# Patient Record
Sex: MALE | Race: WHITE
[De-identification: names, ages, dates, MRNs, and addresses within clinical notes are randomized per-mention and may not be internally consistent; named-entity substitution may affect disease eponyms.]

---

## 2017-04-25 ENCOUNTER — HOSPITAL ENCOUNTER (OUTPATIENT)
Dept: HOSPITAL 58 - SURG | Age: 57
Discharge: HOME | End: 2017-04-25
Attending: INTERNAL MEDICINE

## 2017-04-25 VITALS — SYSTOLIC BLOOD PRESSURE: 138 MMHG | DIASTOLIC BLOOD PRESSURE: 79 MMHG | TEMPERATURE: 98.7 F

## 2017-04-25 VITALS — BODY MASS INDEX: 25.7 KG/M2

## 2017-04-25 DIAGNOSIS — D12.7: ICD-10-CM

## 2017-04-25 DIAGNOSIS — K64.0: ICD-10-CM

## 2017-04-25 DIAGNOSIS — K62.5: Primary | ICD-10-CM

## 2017-04-25 DIAGNOSIS — Z80.0: ICD-10-CM

## 2017-04-25 NOTE — OP
INDICATIONS FOR PROCEDURE: 56-year-old gentleman presents for colonoscopy. He 
has a family history of colon cancer involving his mother. He has had some 
bright red blood per rectum off and on lately. 



MEDICATIONS:  SEE ANESTHESIA NOTES.



PROCEDURE:  COLONOSCOPY, SNARE POLYPECTOMY.



REPORT:  The risks, benefits, alternatives and limitations were discussed in 
detail with the patient.  Informed consent was obtained.



After adequate sedation was achieved, a digital rectal exam revealed good tone, 
no masses.  The colonoscope was introduced into the rectum and advanced under 
direct visual guidance to the cecum.  The cecum was identified by the 
appendiceal orifice and IC valve.  I then slowly withdrew the scope in a 
circumferential manner examining the mucosa quite carefully.  I looked on the 
proximal and distal side of folds and flexures as best as possible. The colonic 
mucosa revealed a few small mouth diverticula scattered throughout the sigmoid. 
On retroflex view of the anal canal there was 1+ internal hemorrhoids. In the 
rectum there was a diminutive 4 or 5 mm polyp that was sessile. I removed this 
by sessile technique. No other abnormalities were noted. The prep was 
excellent. The withdrawal time was 9 minutes and 36 seconds. The patient 
tolerated the procedure well with stable vital signs and pulse oximetry 
throughout.



IMPRESSION:

1.   DIMINUTIVE RECTAL POLYP REMOVED

2.   SIGMOID DIVERTICULOSIS

3.   1+ INTERNAL HEMORRHOIDS WHICH WOULD ACCOUNT FOR THE BRIGHT RED BLOOD PER 
RECTUM



RECOMMENDATIONS: 

1.  High fiber diet

2.  Office visit as needed

3.  Await polyp pathology; if benign as expected, I recommend repeat 
examination again in 5 years, sooner if there are signs or symptoms to indicate 
otherwise.

4.  For his hemorrhoid, besides a high fiber diet, I advised over-the-counter 
Preparation H suppository or similar medications to use as directed on a p.r.n. 
basis. 



CC:  DR. RADHA COLIN

## 2018-07-20 ENCOUNTER — HOSPITAL ENCOUNTER (OUTPATIENT)
Dept: HOSPITAL 58 - RAD | Age: 58
Discharge: HOME | End: 2018-07-20
Attending: FAMILY MEDICINE

## 2018-07-20 VITALS — BODY MASS INDEX: 25.7 KG/M2

## 2018-07-20 DIAGNOSIS — J32.9: Primary | ICD-10-CM

## 2018-07-20 NOTE — CT
EXAM:  CT of the maxillofacial region without contrast 

  

History:  Sinusitis. Left cheek and jaw pain 

  

Technique:  Multiplanar CT images through the maxillofacial region were obtained without the administ
ration of IV contrast. 

  

Findings:  No acute fracture or dislocation.  Orbits are intact.  The visualized intracranial content
s demonstrate no acute findings.  Surrounding soft tissues demonstrate no acute findings.  Mild to mo
derate mucosal thickening of the ethmoid air cells.  Mild mucosal thickening of the bilateral maxilla
ry sinuses and frontal sinuses as well as the sphenoid sinuses.  No prevertebral soft tissue swelling
.  Epiglottis is not thickened. Small chronic appearing right mastoid effusion within the inferior ri
ght mastoid air cells. Nasal septum is bowed to the left.  Bilateral ostiomeatal units are not occlud
ed. 

  

Impression: 

1.  Mild to moderate paranasal sinusitis. 

2.  No acute osseous abnormality. 

3.  No acute abnormality within the surrounding soft tissues. 

4.  Small chronic appearing right mastoid effusion